# Patient Record
Sex: FEMALE | Race: WHITE | NOT HISPANIC OR LATINO | Employment: OTHER | URBAN - METROPOLITAN AREA
[De-identification: names, ages, dates, MRNs, and addresses within clinical notes are randomized per-mention and may not be internally consistent; named-entity substitution may affect disease eponyms.]

---

## 2024-09-26 ENCOUNTER — APPOINTMENT (OUTPATIENT)
Dept: CT IMAGING | Facility: CLINIC | Age: 84
End: 2024-09-26
Attending: SOCIAL WORKER
Payer: COMMERCIAL

## 2024-09-26 ENCOUNTER — APPOINTMENT (OUTPATIENT)
Dept: CARDIOLOGY | Facility: CLINIC | Age: 84
End: 2024-09-26
Attending: PHYSICIAN ASSISTANT
Payer: COMMERCIAL

## 2024-09-26 ENCOUNTER — APPOINTMENT (OUTPATIENT)
Dept: GENERAL RADIOLOGY | Facility: CLINIC | Age: 84
End: 2024-09-26
Attending: SOCIAL WORKER
Payer: COMMERCIAL

## 2024-09-26 ENCOUNTER — HOSPITAL ENCOUNTER (OUTPATIENT)
Facility: CLINIC | Age: 84
Setting detail: OBSERVATION
Discharge: HOME | End: 2024-09-27
Attending: SOCIAL WORKER | Admitting: HOSPITALIST
Payer: COMMERCIAL

## 2024-09-26 DIAGNOSIS — S32.2XXA CLOSED FRACTURE OF COCCYX, INITIAL ENCOUNTER (H): ICD-10-CM

## 2024-09-26 DIAGNOSIS — R00.0 TACHYARRHYTHMIA: ICD-10-CM

## 2024-09-26 DIAGNOSIS — E04.1 THYROID NODULE: ICD-10-CM
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
## 2024-09-26 DIAGNOSIS — R91.8 PULMONARY NODULES: ICD-10-CM

## 2024-09-26 PROBLEM — I48.91 ATRIAL FIBRILLATION WITH RVR (H): Status: ACTIVE | Noted: 2024-09-26
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
## 2024-09-26 LAB
ALBUMIN UR-MCNC: NEGATIVE MG/DL
ANION GAP SERPL CALCULATED.3IONS-SCNC: 17 MMOL/L (ref 7–15)
APPEARANCE UR: CLEAR
ATRIAL RATE - MUSE: 133 BPM
BACTERIA #/AREA URNS HPF: ABNORMAL /HPF
BASOPHILS # BLD AUTO: 0 10E3/UL (ref 0–0.2)
BASOPHILS NFR BLD AUTO: 1 %
BILIRUB UR QL STRIP: NEGATIVE
BUN SERPL-MCNC: 15.7 MG/DL (ref 8–23)
CALCIUM SERPL-MCNC: 9.1 MG/DL (ref 8.8–10.4)
CHLORIDE SERPL-SCNC: 104 MMOL/L (ref 98–107)
COLOR UR AUTO: ABNORMAL
CREAT SERPL-MCNC: 1.01 MG/DL (ref 0.51–0.95)
DIASTOLIC BLOOD PRESSURE - MUSE: NORMAL MMHG
EGFRCR SERPLBLD CKD-EPI 2021: 55 ML/MIN/1.73M2
EOSINOPHIL # BLD AUTO: 0.1 10E3/UL (ref 0–0.7)
EOSINOPHIL NFR BLD AUTO: 2 %
ERYTHROCYTE [DISTWIDTH] IN BLOOD BY AUTOMATED COUNT: 12.5 % (ref 10–15)
GLUCOSE BLDC GLUCOMTR-MCNC: 110 MG/DL (ref 70–99)
GLUCOSE SERPL-MCNC: 106 MG/DL (ref 70–99)
GLUCOSE UR STRIP-MCNC: NEGATIVE MG/DL
HCO3 BLDV-SCNC: 24 MMOL/L (ref 21–28)
HCO3 SERPL-SCNC: 18 MMOL/L (ref 22–29)
HCT VFR BLD AUTO: 45.1 % (ref 35–47)
HGB BLD-MCNC: 14.9 G/DL (ref 11.7–15.7)
HGB UR QL STRIP: NEGATIVE
HOLD SPECIMEN: NORMAL
IMM GRANULOCYTES # BLD: 0 10E3/UL
IMM GRANULOCYTES NFR BLD: 0 %
INTERPRETATION ECG - MUSE: NORMAL
KETONES UR STRIP-MCNC: 20 MG/DL
LACTATE BLD-SCNC: 3.1 MMOL/L
LACTATE SERPL-SCNC: 2.3 MMOL/L (ref 0.7–2)
LEUKOCYTE ESTERASE UR QL STRIP: ABNORMAL
LVEF ECHO: NORMAL
LYMPHOCYTES # BLD AUTO: 3.4 10E3/UL (ref 0.8–5.3)
LYMPHOCYTES NFR BLD AUTO: 42 %
MAGNESIUM SERPL-MCNC: 2.1 MG/DL (ref 1.7–2.3)
MCH RBC QN AUTO: 33.3 PG (ref 26.5–33)
MCHC RBC AUTO-ENTMCNC: 33 G/DL (ref 31.5–36.5)
MCV RBC AUTO: 101 FL (ref 78–100)
MONOCYTES # BLD AUTO: 0.7 10E3/UL (ref 0–1.3)
MONOCYTES NFR BLD AUTO: 8 %
MUCOUS THREADS #/AREA URNS LPF: PRESENT /LPF
NEUTROPHILS # BLD AUTO: 3.8 10E3/UL (ref 1.6–8.3)
NEUTROPHILS NFR BLD AUTO: 48 %
NITRATE UR QL: NEGATIVE
NRBC # BLD AUTO: 0 10E3/UL
NRBC BLD AUTO-RTO: 0 /100
NT-PROBNP SERPL-MCNC: 222 PG/ML (ref 0–1800)
P AXIS - MUSE: NORMAL DEGREES
PCO2 BLDV: 43 MM HG (ref 40–50)
PH BLDV: 7.35 [PH] (ref 7.32–7.43)
PH UR STRIP: 6 [PH] (ref 5–7)
PLATELET # BLD AUTO: 296 10E3/UL (ref 150–450)
PO2 BLDV: 16 MM HG (ref 25–47)
POTASSIUM SERPL-SCNC: 4.7 MMOL/L (ref 3.4–5.3)
PR INTERVAL - MUSE: 120 MS
QRS DURATION - MUSE: 86 MS
QT - MUSE: 306 MS
QTC - MUSE: 455 MS
R AXIS - MUSE: -61 DEGREES
RBC # BLD AUTO: 4.47 10E6/UL (ref 3.8–5.2)
RBC URINE: 1 /HPF
SAO2 % BLDV: 19 % (ref 70–75)
SODIUM SERPL-SCNC: 139 MMOL/L (ref 135–145)
SP GR UR STRIP: 1 (ref 1–1.03)
SQUAMOUS EPITHELIAL: 1 /HPF
SYSTOLIC BLOOD PRESSURE - MUSE: NORMAL MMHG
T AXIS - MUSE: -14 DEGREES
TROPONIN T SERPL HS-MCNC: 14 NG/L
TROPONIN T SERPL HS-MCNC: 25 NG/L
TROPONIN T SERPL HS-MCNC: 35 NG/L
TROPONIN T SERPL HS-MCNC: 37 NG/L
TSH SERPL DL<=0.005 MIU/L-ACNC: 0.43 UIU/ML (ref 0.3–4.2)
UROBILINOGEN UR STRIP-MCNC: NORMAL MG/DL
VENTRICULAR RATE- MUSE: 133 BPM
WBC # BLD AUTO: 8.1 10E3/UL (ref 4–11)
WBC URINE: 9 /HPF

## 2024-09-26 PROCEDURE — 85025 COMPLETE CBC W/AUTO DIFF WBC: CPT | Performed by: SOCIAL WORKER

## 2024-09-26 PROCEDURE — 96365 THER/PROPH/DIAG IV INF INIT: CPT

## 2024-09-26 PROCEDURE — 96361 HYDRATE IV INFUSION ADD-ON: CPT

## 2024-09-26 PROCEDURE — 93005 ELECTROCARDIOGRAM TRACING: CPT | Mod: 76

## 2024-09-26 PROCEDURE — 83605 ASSAY OF LACTIC ACID: CPT

## 2024-09-26 PROCEDURE — G0378 HOSPITAL OBSERVATION PER HR: HCPCS

## 2024-09-26 PROCEDURE — 82803 BLOOD GASES ANY COMBINATION: CPT

## 2024-09-26 PROCEDURE — 250N000009 HC RX 250: Performed by: SOCIAL WORKER

## 2024-09-26 PROCEDURE — 93010 ELECTROCARDIOGRAM REPORT: CPT | Performed by: INTERNAL MEDICINE

## 2024-09-26 PROCEDURE — 82962 GLUCOSE BLOOD TEST: CPT

## 2024-09-26 PROCEDURE — 250N000013 HC RX MED GY IP 250 OP 250 PS 637: Performed by: PHYSICIAN ASSISTANT

## 2024-09-26 PROCEDURE — G1010 CDSM STANSON: HCPCS

## 2024-09-26 PROCEDURE — 93005 ELECTROCARDIOGRAM TRACING: CPT

## 2024-09-26 PROCEDURE — 99291 CRITICAL CARE FIRST HOUR: CPT | Mod: 25

## 2024-09-26 PROCEDURE — 250N000011 HC RX IP 250 OP 636: Performed by: SOCIAL WORKER

## 2024-09-26 PROCEDURE — 84484 ASSAY OF TROPONIN QUANT: CPT | Performed by: SOCIAL WORKER

## 2024-09-26 PROCEDURE — 87086 URINE CULTURE/COLONY COUNT: CPT | Performed by: SOCIAL WORKER

## 2024-09-26 PROCEDURE — 96366 THER/PROPH/DIAG IV INF ADDON: CPT

## 2024-09-26 PROCEDURE — 99222 1ST HOSP IP/OBS MODERATE 55: CPT | Performed by: PHYSICIAN ASSISTANT

## 2024-09-26 PROCEDURE — 84484 ASSAY OF TROPONIN QUANT: CPT | Performed by: PHYSICIAN ASSISTANT

## 2024-09-26 PROCEDURE — 36415 COLL VENOUS BLD VENIPUNCTURE: CPT | Performed by: PHYSICIAN ASSISTANT

## 2024-09-26 PROCEDURE — 74177 CT ABD & PELVIS W/CONTRAST: CPT | Mod: MG

## 2024-09-26 PROCEDURE — 87040 BLOOD CULTURE FOR BACTERIA: CPT | Performed by: SOCIAL WORKER

## 2024-09-26 PROCEDURE — 83735 ASSAY OF MAGNESIUM: CPT | Performed by: PHYSICIAN ASSISTANT

## 2024-09-26 PROCEDURE — 93306 TTE W/DOPPLER COMPLETE: CPT | Mod: 26 | Performed by: INTERNAL MEDICINE

## 2024-09-26 PROCEDURE — 80048 BASIC METABOLIC PNL TOTAL CA: CPT | Performed by: SOCIAL WORKER

## 2024-09-26 PROCEDURE — 93306 TTE W/DOPPLER COMPLETE: CPT

## 2024-09-26 PROCEDURE — 36415 COLL VENOUS BLD VENIPUNCTURE: CPT | Performed by: SOCIAL WORKER

## 2024-09-26 PROCEDURE — 81001 URINALYSIS AUTO W/SCOPE: CPT | Performed by: SOCIAL WORKER

## 2024-09-26 PROCEDURE — 99222 1ST HOSP IP/OBS MODERATE 55: CPT | Mod: 25 | Performed by: INTERNAL MEDICINE

## 2024-09-26 PROCEDURE — 258N000003 HC RX IP 258 OP 636: Performed by: PHYSICIAN ASSISTANT

## 2024-09-26 PROCEDURE — 83880 ASSAY OF NATRIURETIC PEPTIDE: CPT | Performed by: SOCIAL WORKER

## 2024-09-26 PROCEDURE — 84443 ASSAY THYROID STIM HORMONE: CPT | Performed by: SOCIAL WORKER

## 2024-09-26 PROCEDURE — 71045 X-RAY EXAM CHEST 1 VIEW: CPT

## 2024-09-26 RX ORDER — SIMETHICONE 80 MG
80 TABLET,CHEWABLE ORAL EVERY 6 HOURS PRN
Status: DISCONTINUED | OUTPATIENT
Start: 2024-09-26 | End: 2024-09-27 | Stop reason: HOSPADM

## 2024-09-26 RX ORDER — ONDANSETRON 2 MG/ML
4 INJECTION INTRAMUSCULAR; INTRAVENOUS EVERY 6 HOURS PRN
Status: DISCONTINUED | OUTPATIENT
Start: 2024-09-26 | End: 2024-09-27 | Stop reason: HOSPADM

## 2024-09-26 RX ORDER — ONDANSETRON 4 MG/1
4 TABLET, ORALLY DISINTEGRATING ORAL EVERY 6 HOURS PRN
Status: DISCONTINUED | OUTPATIENT
Start: 2024-09-26 | End: 2024-09-27 | Stop reason: HOSPADM

## 2024-09-26 RX ORDER — AMOXICILLIN 250 MG
1 CAPSULE ORAL 2 TIMES DAILY PRN
Status: DISCONTINUED | OUTPATIENT
Start: 2024-09-26 | End: 2024-09-27 | Stop reason: HOSPADM

## 2024-09-26 RX ORDER — MONTELUKAST SODIUM 10 MG/1
10 TABLET ORAL AT BEDTIME
COMMUNITY

## 2024-09-26 RX ORDER — IOPAMIDOL 755 MG/ML
500 INJECTION, SOLUTION INTRAVASCULAR ONCE
Status: COMPLETED | OUTPATIENT
Start: 2024-09-26 | End: 2024-09-26

## 2024-09-26 RX ORDER — MONTELUKAST SODIUM 10 MG/1
10 TABLET ORAL AT BEDTIME
Status: DISCONTINUED | OUTPATIENT
Start: 2024-09-26 | End: 2024-09-27 | Stop reason: HOSPADM

## 2024-09-26 RX ORDER — ACETAMINOPHEN 325 MG/1
650 TABLET ORAL EVERY 4 HOURS PRN
Status: DISCONTINUED | OUTPATIENT
Start: 2024-09-26 | End: 2024-09-27 | Stop reason: HOSPADM

## 2024-09-26 RX ORDER — PREDNISOLONE ACETATE 10 MG/ML
1 SUSPENSION/ DROPS OPHTHALMIC 2 TIMES DAILY
COMMUNITY

## 2024-09-26 RX ORDER — HEPARIN SODIUM 10000 [USP'U]/100ML
0-5000 INJECTION, SOLUTION INTRAVENOUS CONTINUOUS
Status: DISCONTINUED | OUTPATIENT
Start: 2024-09-26 | End: 2024-09-26

## 2024-09-26 RX ORDER — AMOXICILLIN 250 MG
2 CAPSULE ORAL 2 TIMES DAILY PRN
Status: DISCONTINUED | OUTPATIENT
Start: 2024-09-26 | End: 2024-09-27 | Stop reason: HOSPADM

## 2024-09-26 RX ORDER — SODIUM CHLORIDE, SODIUM LACTATE, POTASSIUM CHLORIDE, CALCIUM CHLORIDE 600; 310; 30; 20 MG/100ML; MG/100ML; MG/100ML; MG/100ML
INJECTION, SOLUTION INTRAVENOUS CONTINUOUS
Status: DISCONTINUED | OUTPATIENT
Start: 2024-09-26 | End: 2024-09-27

## 2024-09-26 RX ORDER — NOREPINEPHRINE BITARTRATE 0.02 MG/ML
INJECTION, SOLUTION INTRAVENOUS
Status: COMPLETED
Start: 2024-09-26 | End: 2024-09-26

## 2024-09-26 RX ORDER — ACETAMINOPHEN 650 MG/1
650 SUPPOSITORY RECTAL EVERY 4 HOURS PRN
Status: DISCONTINUED | OUTPATIENT
Start: 2024-09-26 | End: 2024-09-27 | Stop reason: HOSPADM

## 2024-09-26 RX ADMIN — HEPARIN SODIUM 800 UNITS/HR: 10000 INJECTION, SOLUTION INTRAVENOUS at 07:44

## 2024-09-26 RX ADMIN — SODIUM CHLORIDE 75 ML: 9 INJECTION, SOLUTION INTRAVENOUS at 07:33

## 2024-09-26 RX ADMIN — IOPAMIDOL 73 ML: 755 INJECTION, SOLUTION INTRAVENOUS at 07:33

## 2024-09-26 RX ADMIN — MONTELUKAST 10 MG: 10 TABLET, FILM COATED ORAL at 21:07

## 2024-09-26 RX ADMIN — SODIUM CHLORIDE, POTASSIUM CHLORIDE, SODIUM LACTATE AND CALCIUM CHLORIDE: 600; 310; 30; 20 INJECTION, SOLUTION INTRAVENOUS at 12:10

## 2024-09-26 RX ADMIN — SIMETHICONE 80 MG: 80 TABLET, CHEWABLE ORAL at 20:09

## 2024-09-26 ASSESSMENT — ACTIVITIES OF DAILY LIVING (ADL)
ADLS_ACUITY_SCORE: 29
ADLS_ACUITY_SCORE: 39
ADLS_ACUITY_SCORE: 29
ADLS_ACUITY_SCORE: 29
ADLS_ACUITY_SCORE: 35
ADLS_ACUITY_SCORE: 29
ADLS_ACUITY_SCORE: 29
ADLS_ACUITY_SCORE: 39
ADLS_ACUITY_SCORE: 29
ADLS_ACUITY_SCORE: 29
ADLS_ACUITY_SCORE: 37
ADLS_ACUITY_SCORE: 35
ADLS_ACUITY_SCORE: 35
ADLS_ACUITY_SCORE: 29
ADLS_ACUITY_SCORE: 27
ADLS_ACUITY_SCORE: 29
ADLS_ACUITY_SCORE: 29
ADLS_ACUITY_SCORE: 35
ADLS_ACUITY_SCORE: 29

## 2024-09-26 NOTE — ED PROVIDER NOTES
Emergency Department Note      History of Present Illness     Chief Complaint   No chief complaint on file.      HPI   Sonia Lopez is a 84 year old female who presents to the ED via EMS from home for evaluation of a head injury due to a fall. Per EMS report, the patient became suddenly shortness of breath and dizzy upon standing when walking to use the bathroom tonight which caused her to fall, striking her head on the ground. Doesn't think she lost consciousness. EMS found her to be hypoxic, tachycardic, and hypotensive with ecchymosis to her left temple. 4 LPM supplemental oxygen was administered although reportedly not overtly hypotensive. Patient states she continues to feel short of breath despite the supplemental oxygen, denies any chest pain. Feels like she needs to urinate. She felt normal when she went to bed. She currently lives at home with her daughter. Denies history of heart or lung disease. Patient is not on blood thinners.    Independent Historian   EMS as detailed above.    Review of External Notes   Patient currently has no external notes available for review     Past Medical History     Medical History and Problem List   Cataracts  Osteopenia  Osteoporosis     Medications   Singulair  Tramadol     Surgical History   Appendectomy  Tubal ligation  Hysterectomy  Bunionectomy (B)  Cataract extraction (B)  Blepharoplasty (B)    Physical Exam     Patient Vitals for the past 24 hrs:   BP Temp Temp src Pulse Resp SpO2 Weight   09/26/24 2035 126/43 98.3  F (36.8  C) Oral 57 18 95 % --   09/26/24 2008 127/46 98.2  F (36.8  C) Oral 56 16 95 % --   09/26/24 1545 (!) 127/38 98.2  F (36.8  C) Oral 55 16 96 % --   09/26/24 1332 117/43 -- -- 55 -- -- --   09/26/24 1054 -- 98.3  F (36.8  C) Oral 62 17 97 % --   09/26/24 1000 97/43 -- -- 51 15 95 % --   09/26/24 0950 103/45 -- -- 54 15 94 % --   09/26/24 0940 102/48 -- -- 55 24 94 % --   09/26/24 0920 103/62 -- -- 63 17 100 % --   09/26/24 0910 98/59 -- --  65 18 94 % --   09/26/24 0900 103/46 -- -- 63 15 96 % --   09/26/24 0850 92/58 -- -- 58 15 97 % --   09/26/24 0844 110/56 -- -- 66 14 98 % --   09/26/24 0835 -- -- -- 68 16 100 % 68 kg (149 lb 14.6 oz)   09/26/24 0830 100/49 -- -- 67 11 98 % --   09/26/24 0820 (!) 89/47 -- -- 54 16 95 % --   09/26/24 0745 108/59 -- -- 70 15 98 % --   09/26/24 0740 100/52 -- -- 64 16 97 % --   09/26/24 0700 98/47 -- -- 61 17 95 % --   09/26/24 0643 96/63 -- -- (!) 139 14 97 % --   09/26/24 0633 (!) 80/56 -- -- (!) 146 16 92 % --   09/26/24 0603 115/62 -- -- (!) 130 12 97 % --   09/26/24 0558 111/74 -- -- (!) 122 29 99 % --   09/26/24 0553 93/71 -- -- (!) 133 23 97 % --   09/26/24 0550 98/67 -- -- (!) 145 27 97 % --   09/26/24 0545 (!) 89/53 -- -- (!) 145 10 98 % --   09/26/24 0540 (!) 71/49 -- -- (!) 144 21 97 % --   09/26/24 0538 (!) 74/43 -- -- (!) 149 27 98 % --   09/26/24 0537 (!) 77/61 -- -- (!) 128 19 97 % --   09/26/24 0532 (!) 81/58 -- -- (!) 145 (!) 33 97 % --   09/26/24 0531 -- -- -- (!) 132 19 98 % --   09/26/24 0521 97/61 -- -- (!) 140 20 -- --   09/26/24 0516 (!) 136/116 -- -- (!) 125 (!) 40 99 % --   09/26/24 0511 (!) 131/122 -- -- (!) 142 (!) 48 99 % --   09/26/24 0509 -- 97.6  F (36.4  C) Oral -- -- -- --   09/26/24 0508 -- -- -- -- (!) 33 100 % --   09/26/24 0507 -- -- -- (!) 151 (!) 39 100 % --   09/26/24 0506 -- -- -- -- 13 100 % --   09/26/24 0505 -- -- -- -- -- 100 % --   09/26/24 0504 -- -- -- -- -- 100 % --   09/26/24 0502 121/73 -- -- 69 -- -- --     Physical Exam  General: In moderate distress, tachypneic   HEENT: Mucous membranes moist; hematoma over the left eyebrow area   Neuro: Alert, moving all extremities equally with intention  CV: Tachycardic, irregular rate    Pulses equal throughout   Respiratory:  Tachypneic   Lungs clear to auscultation   Abdomen: Soft, without rigidity or rebound throughout   Tender to palpation in the suprapubic region   MSK: No unilateral LE swelling, no edema     Diagnostics      Lab Results   Labs Ordered and Resulted from Time of ED Arrival to Time of ED Departure   BASIC METABOLIC PANEL - Abnormal       Result Value    Sodium 139      Potassium 4.7      Chloride 104      Carbon Dioxide (CO2) 18 (*)     Anion Gap 17 (*)     Urea Nitrogen 15.7      Creatinine 1.01 (*)     GFR Estimate 55 (*)     Calcium 9.1      Glucose 106 (*)    CBC WITH PLATELETS AND DIFFERENTIAL - Abnormal    WBC Count 8.1      RBC Count 4.47      Hemoglobin 14.9      Hematocrit 45.1       (*)     MCH 33.3 (*)     MCHC 33.0      RDW 12.5      Platelet Count 296      % Neutrophils 48      % Lymphocytes 42      % Monocytes 8      % Eosinophils 2      % Basophils 1      % Immature Granulocytes 0      NRBCs per 100 WBC 0      Absolute Neutrophils 3.8      Absolute Lymphocytes 3.4      Absolute Monocytes 0.7      Absolute Eosinophils 0.1      Absolute Basophils 0.0      Absolute Immature Granulocytes 0.0      Absolute NRBCs 0.0     GLUCOSE BY METER - Abnormal    GLUCOSE BY METER POCT 110 (*)    ISTAT GASES LACTATE VENOUS POCT - Abnormal    Lactic Acid POCT 3.1 (*)     Bicarbonate Venous POCT 24      O2 Sat, Venous POCT 19 (*)     pCO2 Venous POCT 43      pH Venous POCT 7.35      pO2 Venous POCT 16 (*)    ROUTINE UA WITH MICROSCOPIC REFLEX TO CULTURE - Abnormal    Color Urine Light Yellow      Appearance Urine Clear      Glucose Urine Negative      Bilirubin Urine Negative      Ketones Urine 20 (*)     Specific Gravity Urine 1.005      Blood Urine Negative      pH Urine 6.0      Protein Albumin Urine Negative      Urobilinogen Urine Normal      Nitrite Urine Negative      Leukocyte Esterase Urine Large (*)     Bacteria Urine Few (*)     Mucus Urine Present (*)     RBC Urine 1      WBC Urine 9 (*)     Squamous Epithelials Urine 1     TROPONIN T, HIGH SENSITIVITY - Abnormal    Troponin T, High Sensitivity 25 (*)    LACTIC ACID WHOLE BLOOD - Abnormal    Lactic Acid 2.3 (*)    TROPONIN T, HIGH SENSITIVITY - Normal     Troponin T, High Sensitivity 14     NT PROBNP INPATIENT - Normal    N terminal Pro BNP Inpatient 222     TSH WITH FREE T4 REFLEX - Normal    TSH 0.43     MAGNESIUM - Normal    Magnesium 2.1     URINE CULTURE       Imaging   Echocardiogram Complete   Final Result      CT Chest (PE) Abdomen Pelvis w Contrast   Final Result   IMPRESSION:   1.  No evidence of acute pulmonary embolism.   2.  Age-indeterminate minimally displaced fracture of the coccyx which   can be correlated with point tenderness. Otherwise, no acute traumatic   findings within the chest, abdomen, or pelvis.   3.  Marked distention of the urinary bladder.   4.  Right thyroid nodule measuring 2.8 cm. Recommend outpatient   thyroid ultrasound.   5.  Pulmonary nodules measuring up to 5 mm in size. See   recommendations below.      REFERENCE:   Guidelines for Management of Incidental Pulmonary Nodules Detected on   CT Images: From the Fleischner Society 2017.    Guidelines apply to incidental nodules in patients who are 35 years or   older.   Guidelines do not apply to lung cancer screening, patients with   immunosuppression, or patients with known primary cancer.      MULTIPLE NODULES   Nodule size <6 mm   Low-risk patients: No follow-up needed.   High-risk patients: Optional follow-up at 12 months.      MICHELLE FERGUSON MD            SYSTEM ID:  [id number]      Head CT w/o contrast   Final Result   IMPRESSION:    1. No acute intracranial pathology.    2. Chronic small vessel ischemic disease.      MIRELA VILLANUEVA MD            SYSTEM ID:  [id number]      XR Chest Port 1 View   Final Result   IMPRESSION: Hyperinflation. Areas of increased nodular density in the left lung base projecting over the heart may represent retrocardiac infiltrate or pneumonia. Normal heart size and pulmonary vascularity. Aortic calcification.          EKG  ECG taken at 0507, ECG read at 0508  Undetermined rhythm   Left axis deviation  Incomplete right bundle branch block  ST  depression, consider subendocardial injury   Rate 133 bpm. UT interval * ms. QRS duration 102 ms. QT/QTc 282/419 ms. P-R-T axes * -60 18.     EKG   ECG taken at 0606, ECG read at 0610  Undetermined rhythm  Left axis deviation  Anterior infarct, age undetermined  Marked ST abnormality, possible inferior subendocardial injury   No significant change as compared to prior, dated 9/26/24 at 0507.  Rate 133 bpm. UT interval 120 ms. QRS duration 86 ms. QT/QTc 306/455 ms. P-R-T axes * -61 -14.    EKG  ECG taken at 0650, ECG read at 0651  Sinus rhythm with sinus arrhythmia   Incomplete right bundle branch block  Left anterior fascicular block  Rhythm is now sinus as compared to prior  Rate 62 bpm. UT interval 62 ms. QRS duration 166 ms. QT/QTc 400/406 ms. P-R-T axes 64 -46 38.     Independent Interpretation   CXR: No significant pulmonary edema     ED Course      Medications Administered   Medications - No data to display    Procedures   Procedures     Bedside POCUS  Bilateral lung sliding  No B-lines seen   No pericardial effusion   No abdominal aortic aneurysm  No free fluid in abdomen  Large distended bladder     Discussion of Management   Admitting Hospitalist, Jessica Camara PA-C     ED Course   ED Course as of 09/26/24 2113   Thu Sep 26, 2024   0510 I obtained history and performed a physical exam as noted above.    0552 Lungs clear, no pericardial effusion, no abdominal aortic dilation.    0604 Reassessed, patient states her breathing is now very good and BP is 110s systolic    0610 Repeat EKG appears to be more consistent with flutter    0653 I rechecked and updated the patient.    0658 Patient is now in sinus rhythm.        Additional Documentation  None    Medical Decision Making / Diagnosis     CMS Diagnoses: None    MIPS       None    MDM   Sonia Lopez is a 84 year old female with reportedly no heart or lung problems, not on AC, who presents to the ED with chief complaint of fall in setting of sudden onset  shortness of breath and dizziness.  On arrival to the emergency department, patient was in moderate distress on O2 although never noted to be hypoxic per EMS. HR in predominantly in the 130s-140 with irregular rhythm, afib vs flutter. Pt initially not hypotensive however did become hypotensive which responded to fluid administration. No pulmonary edema, chest pain, or altered mental status to warrant immediate cardioversion.  Bedside ultrasound as above. Patient spontaneously converted to sinus rhythm and was started on heparin given CHADsVASC 3. CT head without evidence of bleed and CT PE showed no signs of PE, did show age-indeterminate coccyx fracture, thyroid nodule, and pulmonary nodules. No further dizziness noted and moving all extremities equally. UA possibly consistent with UTI, will begin ceftriaxone. Likely will need cardiology consult, echo, and further monitoring. Discussed with hospitalist Jessica Camara PA-C, who kindly accepted for admission.     Disposition   The patient was admitted to the hospital.     Diagnosis     ICD-10-CM    1. Tachyarrhythmia  R00.0       2. Ground-level fall  W18.30XA       3. Thyroid nodule  E04.1       4. Pulmonary nodules  R91.8       5. Closed fracture of coccyx, initial encounter (H)  S32.2XXA            Discharge Medications   New Prescriptions    No medications on file         Scribe Disclosure:  Pamela LOMELI, am serving as a scribe at 5:04 AM on 9/26/2024 to document services personally performed by Maranda Marshall MD based on my observations and the provider's statements to me.        Maranda Marshall MD  09/26/24 7102

## 2024-09-26 NOTE — ED NOTES
[facility]  ED Nurse Handoff Report    ED Chief complaint: Fall  . ED Diagnosis:   Final diagnoses:   None       Allergies:   Allergies   Allergen Reactions    Amoxicillin        Code Status: Full Code    Activity level - Baseline/Home:  independent.  Activity Level - Current:   assist of 1.   Lift room needed: No.   Bariatric: No   Needed: No   Isolation: No.   Infection: Not Applicable.     Respiratory status: Room air    Vital Signs (within 30 minutes):   Vitals:    09/26/24 0850 09/26/24 0900 09/26/24 0910 09/26/24 0920   BP: 92/58 103/46 98/59 103/62   Pulse: 58 63 65 63   Resp: 15 15 18 17   Temp:       TempSrc:       SpO2: 97% 96% 94% 100%   Weight:           Cardiac Rhythm:  ,      Pain level:    Patient confused: No.   Patient Falls Risk: nonskid shoes/slippers when out of bed, arm band in place, patient and family education, assistive device/personal items within reach, activity supervised, and room door open.   Elimination Status: Has voided     Patient Report - Initial Complaint: Fell at home and hit her head.   Focused Assessment: Sonia Lopez is a 84 year old female who presents to the ED via EMS from home for evaluation of a head injury due to a fall. Per EMS report, the patient became suddenly shortness of breath and dizzy upon standing when waking to use the bathroom tonight which caused her to fall, striking her head on the ground. EMS found her to be hypoxic, tachycardic, and hypotensive with ecchymosis to her left temple. 4 LPM supplemental oxygen was administered. Patient states she continues to feel short of breath despite the supplemental oxygen. Reports she woke up with pressure in her pelvis and nausea. She felt normal when she went to bed. She currently lives at home with her daughter. Denies history of heart or lung disease. Patient is not on blood thinners.      Abnormal Results:   Labs Ordered and Resulted from Time of ED Arrival to Time of ED  Departure   BASIC METABOLIC PANEL - Abnormal       Result Value    Sodium 139      Potassium 4.7      Chloride 104      Carbon Dioxide (CO2) 18 (*)     Anion Gap 17 (*)     Urea Nitrogen 15.7      Creatinine 1.01 (*)     GFR Estimate 55 (*)     Calcium 9.1      Glucose 106 (*)    CBC WITH PLATELETS AND DIFFERENTIAL - Abnormal    WBC Count 8.1      RBC Count 4.47      Hemoglobin 14.9      Hematocrit 45.1       (*)     MCH 33.3 (*)     MCHC 33.0      RDW 12.5      Platelet Count 296      % Neutrophils 48      % Lymphocytes 42      % Monocytes 8      % Eosinophils 2      % Basophils 1      % Immature Granulocytes 0      NRBCs per 100 WBC 0      Absolute Neutrophils 3.8      Absolute Lymphocytes 3.4      Absolute Monocytes 0.7      Absolute Eosinophils 0.1      Absolute Basophils 0.0      Absolute Immature Granulocytes 0.0      Absolute NRBCs 0.0     GLUCOSE BY METER - Abnormal    GLUCOSE BY METER POCT 110 (*)    ISTAT GASES LACTATE VENOUS POCT - Abnormal    Lactic Acid POCT 3.1 (*)     Bicarbonate Venous POCT 24      O2 Sat, Venous POCT 19 (*)     pCO2 Venous POCT 43      pH Venous POCT 7.35      pO2 Venous POCT 16 (*)    ROUTINE UA WITH MICROSCOPIC REFLEX TO CULTURE - Abnormal    Color Urine Light Yellow      Appearance Urine Clear      Glucose Urine Negative      Bilirubin Urine Negative      Ketones Urine 20 (*)     Specific Gravity Urine 1.005      Blood Urine Negative      pH Urine 6.0      Protein Albumin Urine Negative      Urobilinogen Urine Normal      Nitrite Urine Negative      Leukocyte Esterase Urine Large (*)     Bacteria Urine Few (*)     Mucus Urine Present (*)     RBC Urine 1      WBC Urine 9 (*)     Squamous Epithelials Urine 1     TROPONIN T, HIGH SENSITIVITY - Abnormal    Troponin T, High Sensitivity 25 (*)    LACTIC ACID WHOLE BLOOD - Abnormal    Lactic Acid 2.3 (*)    TROPONIN T, HIGH SENSITIVITY - Normal    Troponin T, High Sensitivity 14     NT PROBNP INPATIENT - Normal    N terminal Pro  BNP Inpatient 222     TSH WITH FREE T4 REFLEX   MAGNESIUM   BLOOD CULTURE   URINE CULTURE        CT Chest (PE) Abdomen Pelvis w Contrast   Final Result   IMPRESSION:   1.  No evidence of acute pulmonary embolism.   2.  Age-indeterminate minimally displaced fracture of the coccyx which   can be correlated with point tenderness. Otherwise, no acute traumatic   findings within the chest, abdomen, or pelvis.   3.  Marked distention of the urinary bladder.   4.  Right thyroid nodule measuring 2.8 cm. Recommend outpatient   thyroid ultrasound.   5.  Pulmonary nodules measuring up to 5 mm in size. See   recommendations below.      REFERENCE:   Guidelines for Management of Incidental Pulmonary Nodules Detected on   CT Images: From the Fleischner Society 2017.    Guidelines apply to incidental nodules in patients who are 35 years or   older.   Guidelines do not apply to lung cancer screening, patients with   immunosuppression, or patients with known primary cancer.      MULTIPLE NODULES   Nodule size <6 mm   Low-risk patients: No follow-up needed.   High-risk patients: Optional follow-up at 12 months.      MICHELLE FERGUSON MD            SYSTEM ID:  [id number]      Head CT w/o contrast   Final Result   IMPRESSION:    1. No acute intracranial pathology.    2. Chronic small vessel ischemic disease.      MIRELA VILLANUEVA MD            SYSTEM ID:  [id number]      XR Chest Port 1 View   Final Result   IMPRESSION: Hyperinflation. Areas of increased nodular density in the left lung base projecting over the heart may represent retrocardiac infiltrate or pneumonia. Normal heart size and pulmonary vascularity. Aortic calcification.          Treatments provided: Imaging done and heparin started.   Family Comments: Pt stated daughter will be here around 1030.  OBS brochure/video discussed/provided to patient:  Yes  ED Medications:   Medications   heparin 25,000 units in 0.45% NaCl 250 mL ANTICOAGULANT infusion (800 Units/hr Intravenous  $New Bag 9/26/24 0744)   norepinephrine (LEVOPHED) 0.016 mg/mL 4 mg in  mL infusion PREMIX (  Not Given 9/26/24 0708)   heparin loading dose for LOW INTENSITY TREATMENT * Give BEFORE starting heparin infusion (4,100 Units Intravenous $Given 9/26/24 0742)   CT SCAN FLUSH (75 mLs Intravenous $Given 9/26/24 0733)   iopamidol (ISOVUE-370) solution 500 mL (73 mLs Intravenous $Given 9/26/24 0733)       Drips infusing:  Heparin infusing.   For the majority of the shift this patient was Green.   Interventions performed were Imaging done and heparin started..    Sepsis treatment initiated: No    Cares/treatment/interventions/medications to be completed following ED care: Imaging done and heparin started.    ED Nurse Name: Zeny Tarango RN  9:40 AM     RECEIVING UNIT ED HANDOFF REVIEW    Above ED Nurse Handoff Report was reviewed: Yes  Reviewed by: Mely Skaggs RN on September 26, 2024 at 10:13 AM

## 2024-09-26 NOTE — PLAN OF CARE
PRIMARY DIAGNOSIS:syncope   OUTPATIENT/OBSERVATION GOALS TO BE MET BEFORE DISCHARGE:  1. Pain Status: Improved-controlled with oral pain medications.    2. Return to near baseline physical activity: No    3. Cleared for discharge by consultants (if involved): No    Discharge Planner Nurse   Safe discharge environment identified: No  Barriers to discharge: No       Entered by: Joanie Joyner RN 09/26/2024 5:17 PM   Pt alert and oriented, VSS, Pt has IV fluids at 100 ml,, pt  tolerated regulate  diet, orthostatic pressure negative. Echo completed, tele SR HR 60 pt denies pain and dizziness when standing up  pt up  with one assisted for safety the daughter at the bed sidecardiology seen the patient, potential early discharge tomorrow,         Please review provider order for any additional goals.   Nurse to notify provider when observation goals have been met and patient is ready for discharge.Goal Outcome Evaluation:      Plan of Care Reviewed With: patient    Overall Patient Progress: improvingOverall Patient Progress: improving

## 2024-09-26 NOTE — CONSULTS
Cardiology Consultation      Sonia Humphrey MRN# [id number]   YOB: 1940 Age: 84 year old   Date of Admission: 9/26/2024     Reason for consult:            Assessment and Plan:     Presyncope possibly associated with reentry tachycardia unknown if atrial tachycardia versus atypical flutter  Would like her to have some sort of monitoring on discharge.  Discussed 1 week monitor but patient is going out of the country soon.  Apple watch might be reasonable.  This has not happened previously.  She does not have hypertension and no known history of cardiomyopathy so her stroke risk factor is mainly based off of age and sex.  Patient would not be able to get a mail out ZIO before she leaves the country.  If no significant cardiomyopathy on echocardiogram, okay to discharge on small dose of metoprolol 12.5 mg twice daily knowing that this could exacerbate symptoms if she has underlying sick sinus syndrome.  Should follow-up with cardiology in [place] 1 month.    Moderate complexity    The longitudinal plan of care for the diagnosis(es)/condition(s) as documented were addressed during this visit. Due to the added complexity in care, I will continue to support Sonia in the subsequent management and with ongoing continuity of care.          Chief Complaint:   Fall           History of Present Illness:   This patient is a 84 year old female with no previous cardiovascular history who comes in after presyncope, tachycardia, now in sinus rhythm.    Prior to this presentation, she denies any exertional chest discomfort or dyspnea on exertion.  Good exercise tolerance.  No lightheadedness.  No presyncope.    Overnight, when she got up to go to the bathroom she felt lightheaded and dizzy.  I reviewed the ECG from her presentation and it is some sort of reentry tachycardia, possible atypical atrial flutter versus atrial tachycardia with 2-1 block with heart rate greater than 40 bpm.  No significant troponin  elevation.    She spontaneously converted to sinus rhythm.    She lives in [place] and is looking to go back there in the next week.  Echocardiogram is pending.         Physical Exam:   Vitals were reviewed  Blood pressure 97/43, pulse 62, temperature 98.3  F (36.8  C), temperature source Oral, resp. rate 17, weight 68 kg (149 lb 14.6 oz), SpO2 97%.  Temperatures:  Current - Temp: 98.3  F (36.8  C); Max - Temp  Av  F (36.7  C)  Min: 97.6  F (36.4  C)  Max: 98.3  F (36.8  C)  Respiration range: Resp  Av.7  Min: 10  Max: 48  Pulse range: Pulse  Av.2  Min: 51  Max: 151  Blood pressure range: Systolic (24hrs), Av , Min:71 , Max:136   ; Diastolic (24hrs), Av, Min:43, Max:122    Pulse oximetry range: SpO2  Av.4 %  Min: 92 %  Max: 100 %    Intake/Output Summary (Last 24 hours) at 2024 1153  Last data filed at 2024 0514  Gross per 24 hour   Intake 200 ml   Output --   Net 200 ml     Constitutional:   awake, alert, cooperative, no apparent distress, and appears stated age     Eyes:   Lids and lashes normal, pupils equal, round and reactive to light, extra ocular muscles intact, sclera clear, conjunctiva normal     Neck:   supple, symmetrical, trachea midline,      Back:   symmetric     Lungs:   Symmetric     Cardiovascular:   Regular, no significant murmurs.  Distant heart sounds.     Abdomen:   non-tender     Musculoskeletal:   motor strength is 5 out of 5 all extremities bilaterally     Neurologic:   Grossly nonfocal     Skin:   normal skin color, texture, turgor     Additional findings:                    Past Medical History:   I have reviewed this patient's past medical history  No past medical history on file.          Past Surgical History:   I have reviewed this patient's past surgical history  No past surgical history on file.            Social History:   I have reviewed this patient's social history  Social History     Tobacco Use    Smoking status: Not on file    Smokeless  tobacco: Not on file   Substance Use Topics    Alcohol use: Not on file             Family History:   I have reviewed this patient's family history  No family history on file.          Allergies:     Allergies   Allergen Reactions    Amoxicillin              Medications:   I have reviewed this patient's current medications  Medications Prior to Admission   Medication Sig Dispense Refill Last Dose    CALCIUM PO Take 1 tablet by mouth daily.   9/25/2024 at -    MAGNESIUM PO Take 1 tablet by mouth daily.   9/25/2024 at -    montelukast (SINGULAIR) 10 MG tablet Take 10 mg by mouth at bedtime.   9/25/2024 at pm    prednisoLONE acetate (PRED FORTE) 1 % ophthalmic suspension Place 1 drop Into the left eye 2 times daily. After completing two times daily, decrease to daily.   9/25/2024 at pm     Current Facility-Administered Medications   Medication Dose Route Frequency Provider Last Rate Last Admin    acetaminophen (TYLENOL) tablet 650 mg  650 mg Oral Q4H PRN Jamila Camara PA-C        Or    acetaminophen (TYLENOL) Suppository 650 mg  650 mg Rectal Q4H PRN Jamila Camraa PA-C        lactated ringers infusion   Intravenous Continuous Jamila Camara PA-C        montelukast (SINGULAIR) tablet 10 mg  10 mg Oral At Bedtime Jamila Camara PA-C        ondansetron (ZOFRAN ODT) ODT tab 4 mg  4 mg Oral Q6H PRN Jamila Camara PA-C        Or    ondansetron (ZOFRAN) injection 4 mg  4 mg Intravenous Q6H PRN Jamila Camara PA-C        senna-docusate (SENOKOT-S/PERICOLACE) 8.6-50 MG per tablet 1 tablet  1 tablet Oral BID PRN Jamila Camara PA-C        Or    senna-docusate (SENOKOT-S/PERICOLACE) 8.6-50 MG per tablet 2 tablet  2 tablet Oral BID PRN Jamila Camara PA-C                 Review of Systems:     The 10 point Review of Systems is negative other than noted in the HPI            Data:   All laboratory data reviewed  Results for orders placed or performed  during the hospital encounter of 09/26/24 (from the past 24 hour(s))   Glucose by meter   Result Value Ref Range    GLUCOSE BY METER POCT 110 (H) 70 - 99 mg/dL   iStat Gases (lactate) venous, POCT   Result Value Ref Range    Lactic Acid POCT 3.1 (H) <=2.0 mmol/L    Bicarbonate Venous POCT 24 21 - 28 mmol/L    O2 Sat, Venous POCT 19 (L) 70 - 75 %    pCO2 Venous POCT 43 40 - 50 mm Hg    pH Venous POCT 7.35 7.32 - 7.43    pO2 Venous POCT 16 (L) 25 - 47 mm Hg   Troponin T, High Sensitivity   Result Value Ref Range    Troponin T, High Sensitivity 14 <=14 ng/L   CBC + differential    Narrative    The following orders were created for panel order CBC + differential.  Procedure                               Abnormality         Status                     ---------                               -----------         ------                     CBC with platelets and d...[[id number]]  Abnormal            Final result                 Please view results for these tests on the individual orders.   Basic metabolic panel (BMP)   Result Value Ref Range    Sodium 139 135 - 145 mmol/L    Potassium 4.7 3.4 - 5.3 mmol/L    Chloride 104 98 - 107 mmol/L    Carbon Dioxide (CO2) 18 (L) 22 - 29 mmol/L    Anion Gap 17 (H) 7 - 15 mmol/L    Urea Nitrogen 15.7 8.0 - 23.0 mg/dL    Creatinine 1.01 (H) 0.51 - 0.95 mg/dL    GFR Estimate 55 (L) >60 mL/min/1.73m2    Calcium 9.1 8.8 - 10.4 mg/dL    Glucose 106 (H) 70 - 99 mg/dL   BNP   Result Value Ref Range    N terminal Pro BNP Inpatient 222 0 - 1,800 pg/mL   CBC with platelets and differential   Result Value Ref Range    WBC Count 8.1 4.0 - 11.0 10e3/uL    RBC Count 4.47 3.80 - 5.20 10e6/uL    Hemoglobin 14.9 11.7 - 15.7 g/dL    Hematocrit 45.1 35.0 - 47.0 %     (H) 78 - 100 fL    MCH 33.3 (H) 26.5 - 33.0 pg    MCHC 33.0 31.5 - 36.5 g/dL    RDW 12.5 10.0 - 15.0 %    Platelet Count 296 150 - 450 10e3/uL    % Neutrophils 48 %    % Lymphocytes 42 %    % Monocytes 8 %    % Eosinophils 2 %    %  Basophils 1 %    % Immature Granulocytes 0 %    NRBCs per 100 WBC 0 <1 /100    Absolute Neutrophils 3.8 1.6 - 8.3 10e3/uL    Absolute Lymphocytes 3.4 0.8 - 5.3 10e3/uL    Absolute Monocytes 0.7 0.0 - 1.3 10e3/uL    Absolute Eosinophils 0.1 0.0 - 0.7 10e3/uL    Absolute Basophils 0.0 0.0 - 0.2 10e3/uL    Absolute Immature Granulocytes 0.0 <=0.4 10e3/uL    Absolute NRBCs 0.0 10e3/uL   [place] Draw    Narrative    The following orders were created for panel order [place] Draw.  Procedure                               Abnormality         Status                     ---------                               -----------         ------                     Extra Blue Top Tube[[id number]]                              Final result                 Please view results for these tests on the individual orders.   Extra Blue Top Tube   Result Value Ref Range    Hold Specimen JIC    XR Chest Port 1 View    Narrative    EXAM: XR CHEST PORT 1 VIEW  LOCATION: [facility]  DATE: 9/26/2024    INDICATION: Shortness of breath  COMPARISON: None.      Impression    IMPRESSION: Hyperinflation. Areas of increased nodular density in the left lung base projecting over the heart may represent retrocardiac infiltrate or pneumonia. Normal heart size and pulmonary vascularity. Aortic calcification.   EKG 12 lead   Result Value Ref Range    Systolic Blood Pressure  mmHg    Diastolic Blood Pressure  mmHg    Ventricular Rate 133 BPM    Atrial Rate 133 BPM    RI Interval 120 ms    QRS Duration 86 ms     ms    QTc 455 ms    P Axis  degrees    R AXIS -61 degrees    T Axis -14 degrees    Interpretation ECG       Undetermined rhythm  Left axis deviation  Anterior infarct , age undetermined  Marked ST abnormality, possible inferior subendocardial injury  Abnormal ECG  When compared with ECG of 26-Sep-2024 05:07, (unconfirmed)  Current undetermined rhythm precludes rhythm comparison, needs review  Anterior infarct is now Present  ST  more depressed Inferior leads  Unconfirmed report - interpretation of this ECG is computer generated - see medical record for final interpretation  Confirmed by - EMERGENCY ROOM, PHYSICIAN ([id number]),  FARA TSAI ([id number]) on 9/26/2024 6:36:17 AM     Head CT w/o contrast    Narrative    EXAM: CT HEAD W/O CONTRAST  9/26/2024 7:43 AM     HISTORY:  Fall, struck head       COMPARISON:  No prior similar studies    TECHNIQUE: Using multidetector thin collimation helical acquisition  technique, axial, coronal and sagittal CT images from the skull base  to the vertex were obtained without intravenous contrast.   (topogram) image(s) also obtained and reviewed. Dose reduction  techniques were performed.    FINDINGS:  No intracranial hemorrhage, mass effect, or midline shift. No acute  loss of gray-white matter differentiation in the cerebral hemispheres.  Ventricles are proportionate to the cerebral sulci. Clear basal  cisterns. Periventricular and subcortical hypodensities, representing  chronic small vessel ischemic disease.    The bony calvaria and the bones of the skull base are normal.  Hyperdense lesion in the left maxillary sinus, may represent an  unerupted tooth or osteoma. Mastoid air cells are clear. Grossly  normal orbits.       Impression    IMPRESSION:   1. No acute intracranial pathology.   2. Chronic small vessel ischemic disease.    MIRELA VILLANUEVA MD         SYSTEM ID:  [id number]   CT Chest (PE) Abdomen Pelvis w Contrast    Narrative    CT CHEST PE ABDOMEN PELVIS W CONTRAST 9/26/2024 7:56 AM    CLINICAL HISTORY: Patient developed sudden shortness of breath. Fall.  Difficulty emptying bladder.    TECHNIQUE: TECHNIQUE: CT angiogram chest and routine CT abdomen pelvis  with IV contrast. Arterial phase through the chest and venous phase  through the abdomen and pelvis. 2D and 3D MIP reconstructions were  performed by the CT technologist. Dose reduction techniques were used.      CONTRAST: 73mL  [id number]    COMPARISON: Chest x-ray 9/26/2024.    FINDINGS:   ANGIOGRAM CHEST: Pulmonary arteries are normal caliber and negative  for pulmonary emboli. Thoracic aorta cannot be evaluated for  dissection due to contrast bolus timing. No thoracic aortic aneurysm.    LUNGS AND PLEURA: Subsegmental atelectasis of the lung bases. No  consolidative pneumonia. Left upper lobe pulmonary nodule measuring 5  mm (series 7, image 42). Left upper lobe subpleural nodule measuring 4  mm (series 7, image 136). Biapical pleural-parenchymal scarring. No  pleural effusion or pneumothorax.    MEDIASTINUM/AXILLAE: Right thyroid nodule measuring 2.8 cm. Normal  heart size without pericardial effusion. No thoracic adenopathy.    CORONARY ARTERY CALCIFICATIONS: Mild.    HEPATOBILIARY: Subcentimeter hypoattenuating lesion in the right  hepatic lobe, too small to characterize but not requiring specific  follow-up. No radiopaque gallstones. No biliary ductal dilatation.    PANCREAS: Mild prominence of the main pancreatic duct measuring up to  3 mm in diameter. No peripancreatic inflammatory changes or pancreatic  mass lesion identified.    SPLEEN: Normal size.    ADRENAL GLANDS: No significant nodules.    KIDNEYS/BLADDER: Symmetric renal enhancement without hydronephrosis.  Marked distention of the urinary bladder.    BOWEL: No obstruction or inflammatory change.    PELVIC ORGANS: Hysterectomy.    ADDITIONAL FINDINGS: No ascites. No adenopathy in the abdomen or  pelvis. Mild burden of atherosclerotic disease without abdominal  aortic aneurysm.    MUSCULOSKELETAL: Age indeterminate minimally displaced fracture of the  coccyx.      Impression    IMPRESSION:  1.  No evidence of acute pulmonary embolism.  2.  Age-indeterminate minimally displaced fracture of the coccyx which  can be correlated with point tenderness. Otherwise, no acute traumatic  findings within the chest, abdomen, or pelvis.  3.  Marked distention of the urinary bladder.  4.   Right thyroid nodule measuring 2.8 cm. Recommend outpatient  thyroid ultrasound.  5.  Pulmonary nodules measuring up to 5 mm in size. See  recommendations below.    REFERENCE:  Guidelines for Management of Incidental Pulmonary Nodules Detected on  CT Images: From the Fleischner Society 2017.   Guidelines apply to incidental nodules in patients who are 35 years or  older.  Guidelines do not apply to lung cancer screening, patients with  immunosuppression, or patients with known primary cancer.    MULTIPLE NODULES  Nodule size <6 mm  Low-risk patients: No follow-up needed.  High-risk patients: Optional follow-up at 12 months.    MICHELLE FERGUSON MD         SYSTEM ID:  [id number]   UA with Microscopic reflex to Culture    Specimen: Urine, Midstream   Result Value Ref Range    Color Urine Light Yellow Colorless, Straw, Light Yellow, Yellow    Appearance Urine Clear Clear    Glucose Urine Negative Negative mg/dL    Bilirubin Urine Negative Negative    Ketones Urine 20 (A) Negative mg/dL    Specific Gravity Urine 1.005 1.003 - 1.035    Blood Urine Negative Negative    pH Urine 6.0 5.0 - 7.0    Protein Albumin Urine Negative Negative mg/dL    Urobilinogen Urine Normal Normal, 2.0 mg/dL    Nitrite Urine Negative Negative    Leukocyte Esterase Urine Large (A) Negative    Bacteria Urine Few (A) None Seen /HPF    Mucus Urine Present (A) None Seen /LPF    RBC Urine 1 <=2 /HPF    WBC Urine 9 (H) <=5 /HPF    Squamous Epithelials Urine 1 <=1 /HPF    Narrative    Urine Culture ordered based on laboratory criteria   Troponin T, High Sensitivity   Result Value Ref Range    Troponin T, High Sensitivity 25 (H) <=14 ng/L   Lactic acid whole blood   Result Value Ref Range    Lactic Acid 2.3 (H) 0.7 - 2.0 mmol/L   TSH with free T4 reflex   Result Value Ref Range    TSH 0.43 0.30 - 4.20 uIU/mL   Magnesium   Result Value Ref Range    Magnesium 2.1 1.7 - 2.3 mg/dL   EKG 12-lead, tracing only   Result Value Ref Range    Systolic Blood  "Pressure  mmHg    Diastolic Blood Pressure  mmHg    Ventricular Rate 52 BPM    Atrial Rate 52 BPM    NJ Interval 174 ms    QRS Duration 112 ms     ms    QTc 409 ms    P Axis 52 degrees    R AXIS -40 degrees    T Axis 15 degrees    Interpretation ECG       Sinus bradycardia with sinus arrhythmia  Left axis deviation  Incomplete right bundle branch block  Abnormal ECG     Troponin T, High Sensitivity   Result Value Ref Range    Troponin T, High Sensitivity 35 (H) <=14 ng/L       No results found for: \"CHOL\"  No results found for: \"HDL\"  No results found for: \"LDL\"  No results found for: \"TRIG\"  No results found for: \"CHOLHDLRATIO\"  TSH   Date Value Ref Range Status   09/26/2024 0.43 0.30 - 4.20 uIU/mL Final         EKG results:    Echocardiology:    Cardiac stress testing:    Cardiac angiography:    Clinically Significant Risk Factors Present on Admission                                "

## 2024-09-26 NOTE — PHARMACY-ADMISSION MEDICATION HISTORY
Pharmacist Admission Medication History    Admission medication history is complete. The information provided in this note is only as accurate as the sources available at the time of the update.    Information Source(s): Patient and CareEverywhere/SureScripts via in-person    Pertinent Information: None    Changes made to PTA medication list:  Added: all  Deleted: None  Changed: None    Allergies reviewed with patient and updates made in EHR: yes    Medication History Completed By: Merle Marsh [facility] 9/26/2024 10:14 AM    PTA Med List   Medication Sig Last Dose    CALCIUM PO Take 1 tablet by mouth daily. 9/25/2024 at -    MAGNESIUM PO Take 1 tablet by mouth daily. 9/25/2024 at -    montelukast (SINGULAIR) 10 MG tablet Take 10 mg by mouth at bedtime. 9/25/2024 at pm    prednisoLONE acetate (PRED FORTE) 1 % ophthalmic suspension Place 1 drop Into the left eye 2 times daily. After completing two times daily, decrease to daily. 9/25/2024 at pm

## 2024-09-26 NOTE — H&P
[facility] [place]    History and Physical - Hospitalist Service       Date of Admission:  9/26/2024    Assessment & Plan      Sonia Humphrey is a healthy 84 year old female admitted on 9/26/2024 after episode of fall versus syncope and weakness with findings of tachyarrhythmia in the ED.    In the ED she was afebrile with heart rate as high as 151 that spontaneously converted to 61 after IV fluids, pressure 121/73 and breathing comfortably on room air without hypoxia.  Lab work was remarkable for creatinine 1.01, elevated anion gap of 17, glucose 106, normal electrolytes, , high-sensitivity troponin of 14 with 2-hour repeat of 25, normal CBC and normal TSH.  EKG showed tachyarrhythmia.  UA showed large amount of leukocyte Estrace with 9 white blood cells.  Urine was sent for culture and blood culture was sent.  In the ED she received 500 mL bolus and was started on a heparin drip with observation admission.     #Syncope with tachyarrythmia  -Patient was in her usual state of health and woke up at 4 AM to use the restroom but reports upon getting out of bed she felt globally weak and fell to the floor with inability to get up  -During this time she did not describe palpitations or sensation like her heart was racing but her breathing was difficult  -Did hit her head and CT head is negative  -On arrival to the ED she was in a tachyarrhythmia concerning for A-fib versus a flutter and was started on a heparin drip  -No hx of arrythmias. Did have some diarrhea after lunch on 9/25 so possible hypovolemia playing a role  -Troponin did also elevate during this time to 25  -After receiving 1/2 L of fluids her heart rate improved into the 60s and she felt better  -Initial ECG is read as undetermined rhythm and second ECG is read as bradycardia  -She does not completely meet orthostatic criteria but did have lower blood pressure with standing and felt mild lightheadedness  -Hepar drip was stopped as  it does not appear to be A-fib  -TSH normal, electrolytes normal  -Abnormal UA but does not describe urinary symptoms  -Cardiology consult  -Monitor on tele  -Repeat troponin  -Echocardiogram           Observation Goals: -diagnostic tests and consults completed and resulted, -vital signs normal or at patient baseline, -returns to baseline functional status, Nurse to notify provider when observation goals have been met and patient is ready for discharge.  Diet: Regular Diet Adult    DVT Prophylaxis: Pneumatic Compression Devices  Jasso Catheter: Not present  Lines: None     Cardiac Monitoring: ACTIVE order. Indication: Tachyarrhythmias, acute (48 hours)  Code Status: Full Code      Clinically Significant Risk Factors Present on Admission                                     Disposition Plan     Medically Ready for Discharge: Anticipated Tomorrow         The patient's care was discussed with the Attending Physician, Dr. Abdullahi, Patient, Patient's Family, and ED Consultant(s).    Jamila Camara PA-C  Hospitalist Service  [facility]  Securely message with [employer] (more info)  Text page via [facility] Paging/Directory     ______________________________________________________________________    Chief Complaint   Fall vs syncope, tachyarrhythmia    History is obtained from the patient    History of Present Illness   Sonia Humphrey is a 84 year old female who reports she was in her usual state of health until early this morning.  She reports having used the bathroom at approximately 4 AM and attempting to stand up.  During that time she had some fullness in her stomach but could not pass any gas.  She then reports that she felt globally weak and fell to the ground but was unable to get up.  She felt dizzy/lightheaded and like she was going to pass out.  She is unsure if she did.  When EMS arrived she was hypoxic and tachycardic with hypotension.  She has not been ill recently and denies chest  pain, cough, fever, chills and urinary symptoms.  She had some diarrhea after lunch yesterday.  She lives with her daughter and does not ambulate with a walker or cane.  She does not drink alcohol or smoke cigarettes.      Past Medical History    No past medical history on file.    Past Surgical History   No past surgical history on file.    Prior to Admission Medications   Prior to Admission Medications   Prescriptions Last Dose Informant Patient Reported? Taking?   CALCIUM PO 9/25/2024 at -  Yes Yes   Sig: Take 1 tablet by mouth daily.   MAGNESIUM PO 9/25/2024 at -  Yes Yes   Sig: Take 1 tablet by mouth daily.   montelukast (SINGULAIR) 10 MG tablet 9/25/2024 at pm  Yes Yes   Sig: Take 10 mg by mouth at bedtime.   prednisoLONE acetate (PRED FORTE) 1 % ophthalmic suspension 9/25/2024 at pm  Yes Yes   Sig: Place 1 drop Into the left eye 2 times daily. After completing two times daily, decrease to daily.      Facility-Administered Medications: None          Physical Exam   Vital Signs: Temp: 98.3  F (36.8  C) Temp src: Oral BP: 97/43 Pulse: 62   Resp: 17 SpO2: 97 % O2 Device: None (Room air)    Weight: 149 lbs 14.6 oz    General Appearance: Alert and orientated x 3  Respiratory: CTAB  Cardiovascular: RRR without murmur  GI: Bowel sounds are present without tenderness  Skin: No rash or open sores  Other: No lower extremity swelling    Medical Decision Making       55 MINUTES SPENT BY ME on the date of service doing chart review, history, exam, documentation & further activities per the note.      Data     I have personally reviewed the following data over the past 24 hrs:    8.1  \   14.9   / 296     139 104 15.7 /  106 (H)   4.7 18 (L) 1.01 (H) \     Trop: 25 (H) BNP: 222     TSH: 0.43 T4: N/A A1C: N/A     Procal: N/A CRP: N/A Lactic Acid: 2.3 (H)         Imaging results reviewed over the past 24 hrs:   Recent Results (from the past 24 hour(s))   XR Chest Port 1 View    Narrative    EXAM: XR CHEST PORT 1  VIEW  LOCATION: [facility]  DATE: 9/26/2024    INDICATION: Shortness of breath  COMPARISON: None.      Impression    IMPRESSION: Hyperinflation. Areas of increased nodular density in the left lung base projecting over the heart may represent retrocardiac infiltrate or pneumonia. Normal heart size and pulmonary vascularity. Aortic calcification.   Head CT w/o contrast    Narrative    EXAM: CT HEAD W/O CONTRAST  9/26/2024 7:43 AM     HISTORY:  Fall, struck head       COMPARISON:  No prior similar studies    TECHNIQUE: Using multidetector thin collimation helical acquisition  technique, axial, coronal and sagittal CT images from the skull base  to the vertex were obtained without intravenous contrast.   (topogram) image(s) also obtained and reviewed. Dose reduction  techniques were performed.    FINDINGS:  No intracranial hemorrhage, mass effect, or midline shift. No acute  loss of gray-white matter differentiation in the cerebral hemispheres.  Ventricles are proportionate to the cerebral sulci. Clear basal  cisterns. Periventricular and subcortical hypodensities, representing  chronic small vessel ischemic disease.    The bony calvaria and the bones of the skull base are normal.  Hyperdense lesion in the left maxillary sinus, may represent an  unerupted tooth or osteoma. Mastoid air cells are clear. Grossly  normal orbits.       Impression    IMPRESSION:   1. No acute intracranial pathology.   2. Chronic small vessel ischemic disease.    MIRELA VILLANUEVA MD         SYSTEM ID:  [id number]   CT Chest (PE) Abdomen Pelvis w Contrast    Narrative    CT CHEST PE ABDOMEN PELVIS W CONTRAST 9/26/2024 7:56 AM    CLINICAL HISTORY: Patient developed sudden shortness of breath. Fall.  Difficulty emptying bladder.    TECHNIQUE: TECHNIQUE: CT angiogram chest and routine CT abdomen pelvis  with IV contrast. Arterial phase through the chest and venous phase  through the abdomen and pelvis. 2D and 3D MIP  reconstructions were  performed by the CT technologist. Dose reduction techniques were used.      CONTRAST: 73mL [id number]    COMPARISON: Chest x-ray 9/26/2024.    FINDINGS:   ANGIOGRAM CHEST: Pulmonary arteries are normal caliber and negative  for pulmonary emboli. Thoracic aorta cannot be evaluated for  dissection due to contrast bolus timing. No thoracic aortic aneurysm.    LUNGS AND PLEURA: Subsegmental atelectasis of the lung bases. No  consolidative pneumonia. Left upper lobe pulmonary nodule measuring 5  mm (series 7, image 42). Left upper lobe subpleural nodule measuring 4  mm (series 7, image 136). Biapical pleural-parenchymal scarring. No  pleural effusion or pneumothorax.    MEDIASTINUM/AXILLAE: Right thyroid nodule measuring 2.8 cm. Normal  heart size without pericardial effusion. No thoracic adenopathy.    CORONARY ARTERY CALCIFICATIONS: Mild.    HEPATOBILIARY: Subcentimeter hypoattenuating lesion in the right  hepatic lobe, too small to characterize but not requiring specific  follow-up. No radiopaque gallstones. No biliary ductal dilatation.    PANCREAS: Mild prominence of the main pancreatic duct measuring up to  3 mm in diameter. No peripancreatic inflammatory changes or pancreatic  mass lesion identified.    SPLEEN: Normal size.    ADRENAL GLANDS: No significant nodules.    KIDNEYS/BLADDER: Symmetric renal enhancement without hydronephrosis.  Marked distention of the urinary bladder.    BOWEL: No obstruction or inflammatory change.    PELVIC ORGANS: Hysterectomy.    ADDITIONAL FINDINGS: No ascites. No adenopathy in the abdomen or  pelvis. Mild burden of atherosclerotic disease without abdominal  aortic aneurysm.    MUSCULOSKELETAL: Age indeterminate minimally displaced fracture of the  coccyx.      Impression    IMPRESSION:  1.  No evidence of acute pulmonary embolism.  2.  Age-indeterminate minimally displaced fracture of the coccyx which  can be correlated with point tenderness. Otherwise, no  acute traumatic  findings within the chest, abdomen, or pelvis.  3.  Marked distention of the urinary bladder.  4.  Right thyroid nodule measuring 2.8 cm. Recommend outpatient  thyroid ultrasound.  5.  Pulmonary nodules measuring up to 5 mm in size. See  recommendations below.    REFERENCE:  Guidelines for Management of Incidental Pulmonary Nodules Detected on  CT Images: From the Fleischner Society 2017.   Guidelines apply to incidental nodules in patients who are 35 years or  older.  Guidelines do not apply to lung cancer screening, patients with  immunosuppression, or patients with known primary cancer.    MULTIPLE NODULES  Nodule size <6 mm  Low-risk patients: No follow-up needed.  High-risk patients: Optional follow-up at 12 months.    MICHELLE FERGUSON MD         SYSTEM ID:  [id number]

## 2024-09-26 NOTE — ED TRIAGE NOTES
Patient arrives via EMS from home. Patient was short of breath and then fell hitting her head. Bruise to left temporal. Denies chest pain. Per EMS HR 150s and bp 112/120s. Close to the ER patient became symptomatic with needing oxygen and low blood pressures.

## 2024-09-26 NOTE — PLAN OF CARE
ROOM # 208-2    Living Situation (if not independent, order SW consult):  Facility name: Has own home in [facility] when in [place] lives with her two daughters  :     Activity level at baseline: independent  Activity level on admit: SBA    Who will be transporting you at discharge:     Patient registered to observation; given Patient Bill of Rights; given the opportunity to ask questions about observation status and their plan of care.  Patient has been oriented to the observation room, bathroom and call light is in place.    Discussed discharge goals and expectations with patient/family.         Goal Outcome Evaluation:

## 2024-09-27 ENCOUNTER — APPOINTMENT (OUTPATIENT)
Dept: CARDIOLOGY | Facility: CLINIC | Age: 84
End: 2024-09-27
Attending: INTERNAL MEDICINE
Payer: COMMERCIAL

## 2024-09-27 VITALS
SYSTOLIC BLOOD PRESSURE: 113 MMHG | DIASTOLIC BLOOD PRESSURE: 44 MMHG | HEART RATE: 83 BPM | WEIGHT: 149.91 LBS | OXYGEN SATURATION: 93 % | TEMPERATURE: 98.3 F | RESPIRATION RATE: 20 BRPM

## 2024-09-27 LAB
ANION GAP SERPL CALCULATED.3IONS-SCNC: 11 MMOL/L (ref 7–15)
ATRIAL RATE - MUSE: 62 BPM
ATRIAL RATE - MUSE: 94 BPM
BACTERIA UR CULT: NORMAL
BUN SERPL-MCNC: 13.4 MG/DL (ref 8–23)
CALCIUM SERPL-MCNC: 8.7 MG/DL (ref 8.8–10.4)
CHLORIDE SERPL-SCNC: 105 MMOL/L (ref 98–107)
CREAT SERPL-MCNC: 0.99 MG/DL (ref 0.51–0.95)
DIASTOLIC BLOOD PRESSURE - MUSE: NORMAL MMHG
DIASTOLIC BLOOD PRESSURE - MUSE: NORMAL MMHG
EGFRCR SERPLBLD CKD-EPI 2021: 56 ML/MIN/1.73M2
ERYTHROCYTE [DISTWIDTH] IN BLOOD BY AUTOMATED COUNT: 12.8 % (ref 10–15)
GLUCOSE SERPL-MCNC: 94 MG/DL (ref 70–99)
HCO3 SERPL-SCNC: 23 MMOL/L (ref 22–29)
HCT VFR BLD AUTO: 40.6 % (ref 35–47)
HGB BLD-MCNC: 13.2 G/DL (ref 11.7–15.7)
INTERPRETATION ECG - MUSE: NORMAL
INTERPRETATION ECG - MUSE: NORMAL
MCH RBC QN AUTO: 32.7 PG (ref 26.5–33)
MCHC RBC AUTO-ENTMCNC: 32.5 G/DL (ref 31.5–36.5)
MCV RBC AUTO: 101 FL (ref 78–100)
P AXIS - MUSE: 64 DEGREES
P AXIS - MUSE: NORMAL DEGREES
PLATELET # BLD AUTO: 266 10E3/UL (ref 150–450)
POTASSIUM SERPL-SCNC: 4.2 MMOL/L (ref 3.4–5.3)
PR INTERVAL - MUSE: 166 MS
PR INTERVAL - MUSE: NORMAL MS
QRS DURATION - MUSE: 102 MS
QRS DURATION - MUSE: 110 MS
QT - MUSE: 282 MS
QT - MUSE: 400 MS
QTC - MUSE: 406 MS
QTC - MUSE: 419 MS
R AXIS - MUSE: -46 DEGREES
R AXIS - MUSE: -60 DEGREES
RBC # BLD AUTO: 4.04 10E6/UL (ref 3.8–5.2)
SODIUM SERPL-SCNC: 139 MMOL/L (ref 135–145)
SYSTOLIC BLOOD PRESSURE - MUSE: NORMAL MMHG
SYSTOLIC BLOOD PRESSURE - MUSE: NORMAL MMHG
T AXIS - MUSE: 18 DEGREES
T AXIS - MUSE: 38 DEGREES
VENTRICULAR RATE- MUSE: 133 BPM
VENTRICULAR RATE- MUSE: 62 BPM
WBC # BLD AUTO: 12.1 10E3/UL (ref 4–11)

## 2024-09-27 PROCEDURE — 93225 XTRNL ECG REC<48 HRS REC: CPT

## 2024-09-27 PROCEDURE — G0378 HOSPITAL OBSERVATION PER HR: HCPCS

## 2024-09-27 PROCEDURE — 99239 HOSP IP/OBS DSCHRG MGMT >30: CPT | Performed by: PHYSICIAN ASSISTANT

## 2024-09-27 PROCEDURE — 258N000003 HC RX IP 258 OP 636: Performed by: PHYSICIAN ASSISTANT

## 2024-09-27 PROCEDURE — 99231 SBSQ HOSP IP/OBS SF/LOW 25: CPT | Mod: 25 | Performed by: INTERNAL MEDICINE

## 2024-09-27 PROCEDURE — 85014 HEMATOCRIT: CPT | Performed by: PHYSICIAN ASSISTANT

## 2024-09-27 PROCEDURE — 80048 BASIC METABOLIC PNL TOTAL CA: CPT | Performed by: PHYSICIAN ASSISTANT

## 2024-09-27 PROCEDURE — 93227 XTRNL ECG REC<48 HR R&I: CPT | Performed by: INTERNAL MEDICINE

## 2024-09-27 PROCEDURE — 250N000013 HC RX MED GY IP 250 OP 250 PS 637: Performed by: INTERNAL MEDICINE

## 2024-09-27 PROCEDURE — 36415 COLL VENOUS BLD VENIPUNCTURE: CPT | Performed by: PHYSICIAN ASSISTANT

## 2024-09-27 RX ORDER — METOPROLOL TARTRATE 25 MG/1
12.5 TABLET, FILM COATED ORAL 2 TIMES DAILY
Qty: 30 TABLET | Refills: 0 | Status: SHIPPED | OUTPATIENT
Start: 2024-09-27

## 2024-09-27 RX ADMIN — SODIUM CHLORIDE, POTASSIUM CHLORIDE, SODIUM LACTATE AND CALCIUM CHLORIDE: 600; 310; 30; 20 INJECTION, SOLUTION INTRAVENOUS at 01:31

## 2024-09-27 RX ADMIN — METOPROLOL TARTRATE 12.5 MG: 25 TABLET, FILM COATED ORAL at 09:28

## 2024-09-27 ASSESSMENT — ACTIVITIES OF DAILY LIVING (ADL)
ADLS_ACUITY_SCORE: 31

## 2024-09-27 NOTE — PLAN OF CARE
PRIMARY DIAGNOSIS: Fall vs Syncope  OUTPATIENT/OBSERVATION GOALS TO BE MET BEFORE DISCHARGE:  ADLs back to baseline: Yes    Activity and level of assistance: Up with standby assistance.    Pain status: Pain free.    Return to near baseline physical activity: Yes     Discharge Planner Nurse   Safe discharge environment identified: Yes  Barriers to discharge: Yes       Entered by: Jannette Hernandez RN 09/27/2024 12:39 AM    Patient is A&Ox4, up SBA, currently denies pain, IVF @ 100, ECHO completed, cardiology following, denies dizziness when out of bed       Please review provider order for any additional goals.   Nurse to notify provider when observation goals have been met and patient is ready for discharge.Goal Outcome Evaluation:      Plan of Care Reviewed With: patient    Overall Patient Progress: improvingOverall Patient Progress: improving    Outcome Evaluation: IVF infusing, denies dizziness

## 2024-09-27 NOTE — PLAN OF CARE
PRIMARY DIAGNOSIS: Fall vs Syncope  OUTPATIENT/OBSERVATION GOALS TO BE MET BEFORE DISCHARGE:  ADLs back to baseline: Yes    Activity and level of assistance: Up with standby assistance.    Pain status: Pain free.    Return to near baseline physical activity: Yes     Discharge Planner Nurse   Safe discharge environment identified: Yes  Barriers to discharge: Yes       Entered by: Jannette Hernandez RN 09/27/2024 4:44 AM    Patient is A&Ox4, up SBA, currently denies pain, IVF @ 100, cards following, denies dizziness when out of bed       Please review provider order for any additional goals.   Nurse to notify provider when observation goals have been met and patient is ready for discharge.Goal Outcome Evaluation:      Plan of Care Reviewed With: patient    Overall Patient Progress: improvingOverall Patient Progress: improving    Outcome Evaluation: dizziness improved, hopeful for discharge today

## 2024-09-27 NOTE — DISCHARGE SUMMARY
[facility] Discharge Summary          Sonia Humphrey MRN# [id number]   Age: 84 year old YOB: 1940     Date of Admission:  9/26/2024  Date of Discharge::  9/27/2024 11:28 AM  Admitting Physician:  Andrews Abdullahi MD  Discharge Physician:  Dorie Lema PA-C  Primary Physician: Sophie Juarez     Primary Discharge Diagnoses:   Near Syncope  AT versus AFL     Hospital Course:   For detail history, please refer to H & P from 9/26/2024. In brief, this is a 84 year old female  admitted on 9/26/2024 after episode of fall versus syncope and weakness with findings of tachyarrhythmia in the ED.     In the ED she was afebrile with heart rate as high as 151 that spontaneously converted to 61 after IV fluids, pressure 121/73 and breathing comfortably on room air without hypoxia.  Lab work was remarkable for creatinine 1.01, elevated anion gap of 17, glucose 106, normal electrolytes, , high-sensitivity troponin of 14 with 2-hour repeat of 25, normal CBC and normal TSH.  EKG showed tachyarrhythmia.  UA showed large amount of leukocyte Estrace with 9 white blood cells.  Urine culture was negative.     Near Syncope with tachyarrhythmia  Left scalp ecchymosis  -Patient was in her usual state of health and woke up at 4 AM to use the restroom but reports upon getting out of bed she felt globally weak and fell to the floor with inability to get up  -During this time she did not describe palpitations or sensation like her heart was racing but her breathing was difficult  -Did hit her head and CT head was negative  -On arrival to the ED she was in a tachyarrhythmia concerning for A-fib versus a flutter and was started on a heparin drip  -No hx of arrythmias. Did have some diarrhea after lunch on 9/25 so possible hypovolemia playing a role  -Troponin did also elevate during this time to 25/35/37  -After receiving 1/2 L of fluids her heart rate improved into the 60s and she felt better  -Initial  ECG is read as undetermined rhythm and second ECG is read as bradycardia  -Heparin drip was stopped as it does not appear to be A-fib  -TSH normal, electrolytes normal  -Abnormal UA but did not describe urinary symptoms and urine culture was negative.  -Cardiology consulted and felt Presyncope possibly associated with reentry tachycardia unknown if atrial tachycardia versus atypical flutter   - echocardiogram with no significant cardiomyopathy  - started on Metoprolol 12.5mg bid with hold parameters  - patient is traveling home to [employer] next week.  - 48 hour Holter monitor was placed prior to patient discharge  - Establish care with a Cardiologist in [employer]     Procedures/Imaging:     Results for orders placed or performed during the hospital encounter of 09/26/24   XR Chest Port 1 View    Narrative    EXAM: XR CHEST PORT 1 VIEW  LOCATION: [facility]  DATE: 9/26/2024    INDICATION: Shortness of breath  COMPARISON: None.      Impression    IMPRESSION: Hyperinflation. Areas of increased nodular density in the left lung base projecting over the heart may represent retrocardiac infiltrate or pneumonia. Normal heart size and pulmonary vascularity. Aortic calcification.   Head CT w/o contrast    Narrative    EXAM: CT HEAD W/O CONTRAST  9/26/2024 7:43 AM     HISTORY:  Fall, struck head       COMPARISON:  No prior similar studies    TECHNIQUE: Using multidetector thin collimation helical acquisition  technique, axial, coronal and sagittal CT images from the skull base  to the vertex were obtained without intravenous contrast.   (topogram) image(s) also obtained and reviewed. Dose reduction  techniques were performed.    FINDINGS:  No intracranial hemorrhage, mass effect, or midline shift. No acute  loss of gray-white matter differentiation in the cerebral hemispheres.  Ventricles are proportionate to the cerebral sulci. Clear basal  cisterns. Periventricular and subcortical hypodensities,  representing  chronic small vessel ischemic disease.    The bony calvaria and the bones of the skull base are normal.  Hyperdense lesion in the left maxillary sinus, may represent an  unerupted tooth or osteoma. Mastoid air cells are clear. Grossly  normal orbits.       Impression    IMPRESSION:   1. No acute intracranial pathology.   2. Chronic small vessel ischemic disease.    MIRELA VILLANUEVA MD         SYSTEM ID:  [id number]   CT Chest (PE) Abdomen Pelvis w Contrast    Narrative    CT CHEST PE ABDOMEN PELVIS W CONTRAST 9/26/2024 7:56 AM    CLINICAL HISTORY: Patient developed sudden shortness of breath. Fall.  Difficulty emptying bladder.    TECHNIQUE: TECHNIQUE: CT angiogram chest and routine CT abdomen pelvis  with IV contrast. Arterial phase through the chest and venous phase  through the abdomen and pelvis. 2D and 3D MIP reconstructions were  performed by the CT technologist. Dose reduction techniques were used.      CONTRAST: 73mL [id number]    COMPARISON: Chest x-ray 9/26/2024.    FINDINGS:   ANGIOGRAM CHEST: Pulmonary arteries are normal caliber and negative  for pulmonary emboli. Thoracic aorta cannot be evaluated for  dissection due to contrast bolus timing. No thoracic aortic aneurysm.    LUNGS AND PLEURA: Subsegmental atelectasis of the lung bases. No  consolidative pneumonia. Left upper lobe pulmonary nodule measuring 5  mm (series 7, image 42). Left upper lobe subpleural nodule measuring 4  mm (series 7, image 136). Biapical pleural-parenchymal scarring. No  pleural effusion or pneumothorax.    MEDIASTINUM/AXILLAE: Right thyroid nodule measuring 2.8 cm. Normal  heart size without pericardial effusion. No thoracic adenopathy.    CORONARY ARTERY CALCIFICATIONS: Mild.    HEPATOBILIARY: Subcentimeter hypoattenuating lesion in the right  hepatic lobe, too small to characterize but not requiring specific  follow-up. No radiopaque gallstones. No biliary ductal dilatation.    PANCREAS: Mild prominence of the  main pancreatic duct measuring up to  3 mm in diameter. No peripancreatic inflammatory changes or pancreatic  mass lesion identified.    SPLEEN: Normal size.    ADRENAL GLANDS: No significant nodules.    KIDNEYS/BLADDER: Symmetric renal enhancement without hydronephrosis.  Marked distention of the urinary bladder.    BOWEL: No obstruction or inflammatory change.    PELVIC ORGANS: Hysterectomy.    ADDITIONAL FINDINGS: No ascites. No adenopathy in the abdomen or  pelvis. Mild burden of atherosclerotic disease without abdominal  aortic aneurysm.    MUSCULOSKELETAL: Age indeterminate minimally displaced fracture of the  coccyx.      Impression    IMPRESSION:  1.  No evidence of acute pulmonary embolism.  2.  Age-indeterminate minimally displaced fracture of the coccyx which  can be correlated with point tenderness. Otherwise, no acute traumatic  findings within the chest, abdomen, or pelvis.  3.  Marked distention of the urinary bladder.  4.  Right thyroid nodule measuring 2.8 cm. Recommend outpatient  thyroid ultrasound.  5.  Pulmonary nodules measuring up to 5 mm in size. See  recommendations below.    REFERENCE:  Guidelines for Management of Incidental Pulmonary Nodules Detected on  CT Images: From the Fleischner Society 2017.   Guidelines apply to incidental nodules in patients who are 35 years or  older.  Guidelines do not apply to lung cancer screening, patients with  immunosuppression, or patients with known primary cancer.    MULTIPLE NODULES  Nodule size <6 mm  Low-risk patients: No follow-up needed.  High-risk patients: Optional follow-up at 12 months.    MICHELLE FERGUSON MD         SYSTEM ID:  [id number]   Echocardiogram Complete     Value    LVEF  65-70%    Narrative    [id number]  [id number]  [id number]  [id number]^CALEB^MIKAYLA^[facility]  Echocardiography Laboratory  [address] [place] [address]     Name: MAKSIM MEDEL  MRN: [id number]  : 1940  Study Date:  09/26/2024 12:46 PM  Age: 84 yrs  Gender: Female  Patient Location: [facility]  Reason For Study: Tachycardia  Ordering Physician: MIKAYLA ELLIS  Performed By: Nilda Bear     BSA: 1.7 m2  Height: 61 in  Weight: 149 lb  BP: 97/43 mmHg  ______________________________________________________________________________  Procedure  Complete Portable Echo Adult.  ______________________________________________________________________________  Interpretation Summary     The visual ejection fraction is 65-70%.  Left ventricular systolic function is normal.  ______________________________________________________________________________  Left Ventricle  The left ventricle is normal in size. There is normal left ventricular wall  thickness. Diastolic Doppler findings (E/E' ratio and/or other parameters)  suggest left ventricular filling pressures are normal. The visual ejection  fraction is 65-70%. Left ventricular systolic function is normal.     Right Ventricle  The right ventricle is normal in size and function.     Atria  Normal left atrial size. Right atrial size is normal. There is no color  Doppler evidence of an atrial shunt.     Mitral Valve  There is mild (1+) mitral regurgitation.     Tricuspid Valve  There is trace tricuspid regurgitation. The right ventricular systolic  pressure is approximated at 22.4 mmHg plus the right atrial pressure.     Aortic Valve  The aortic valve is trileaflet. No aortic regurgitation is present. No  hemodynamically significant valvular aortic stenosis.     Pulmonic Valve  There is no pulmonic valvular regurgitation. Normal pulmonic valve velocity.     Vessels  The aortic root is normal size. Normal size ascending aorta. IVC diameter <2.1  cm collapsing >50% with sniff suggests a normal RA pressure of 3 mmHg.     Pericardium  There is no pericardial effusion.     Rhythm  Sinus rhythm was noted.  ______________________________________________________________________________  MMode/2D  Measurements & Calculations  IVSd: 0.73 cm  LVIDd: 5.0 cm  LVIDs: 2.4 cm  LVPWd: 0.55 cm  FS: 51.5 %  LV mass(C)d: 102.0 grams  LV mass(C)dI: 61.2 grams/m2  Ao root diam: 2.9 cm  asc Aorta Diam: 3.0 cm  LVOT diam: 1.9 cm  LVOT area: 2.8 cm2  Ao root diam index Ht(cm/m): 1.9  Ao root diam index BSA (cm/m2): 1.8  Asc Ao diam index BSA (cm/m2): 1.8  Asc Ao diam index Ht(cm/m): 2.0  LA Volume (BP): 53.5 ml     LA Volume Index (BP): 32.0 ml/m2  RWT: 0.22  TAPSE: 2.6 cm     Doppler Measurements & Calculations  MV E max chiara: 67.3 cm/sec  MV A max chiara: 35.3 cm/sec  MV E/A: 1.9  MV dec slope: 293.9 cm/sec2  MV dec time: 0.23 sec  PA acc time: 0.10 sec  TR max chiara: 236.4 cm/sec  TR max P.4 mmHg  E/E' av.5  Lateral E/e': 4.9  Medial E/e': 6.0     ______________________________________________________________________________  Report approved by: Broderick Almendarez 2024 02:02 PM           Allergies:     Allergies   Allergen Reactions    Amoxicillin         Subjective:   Patient reports she feels at her baseline currently.  Planning to fly home to [place] next week.  Staying with family currently.    Physical Exam:   Blood pressure 113/44, pulse 83, temperature 98.3  F (36.8  C), temperature source Oral, resp. rate 20, weight 68 kg (149 lb 14.6 oz), SpO2 93%.  General: Alert, interactive, NAD  HEENT: mild ecchymosis of the left temple area  Resp: clear to auscultation bilaterally, no crackles or wheezes  Cardiac: regular rate and rhythm, no murmur  Abdomen: Soft, nontender, nondistended. +BS.  No rebound or guarding.  Extremities: No LE edema  Skin: Warm and dry  Neuro: Alert & oriented x 3, moves all extremities equally    Discharge Medicatios:        Discharge Medication List as of 2024 10:46 AM        START taking these medications    Details   metoprolol tartrate (LOPRESSOR) 25 MG tablet Take 0.5 tablets (12.5 mg) by mouth 2 times daily. Hold if your heart rate is less than 60 or systolic blood  pressure is less than 120, Disp-30 tablet, R-0, E-Prescribe           CONTINUE these medications which have NOT CHANGED    Details   CALCIUM PO Take 1 tablet by mouth daily., Historical      MAGNESIUM PO Take 1 tablet by mouth daily., Historical      montelukast (SINGULAIR) 10 MG tablet Take 10 mg by mouth at bedtime., Historical      prednisoLONE acetate (PRED FORTE) 1 % ophthalmic suspension Place 1 drop Into the left eye 2 times daily. After completing two times daily, decrease to daily., Historical             Instructions Given to Patient as Discharge:     Discharge Procedure Orders   Reason for your hospital stay   Order Comments: You were hospitalized due to Presyncope possibly associated with reentry tachycardia unknown if atrial tachycardia versus atypical flutter.  Your HR improved with IVF.  Cardiology was consulted and you had an echocardiogram with no acute abnormalities.  Cardiology recommends to start Metoprolol and discharge on a heart monitor     Follow-up and recommended labs and tests    Order Comments: Complete your heart monitor here as instructed.  Follow up with PCP in one week and Cardiology.     Activity   Order Comments: Your activity upon discharge: activity as tolerated     Order Specific Question Answer Comments   Is discharge order? Yes      Diet   Order Comments: Follow this diet upon discharge: Current Diet:Orders Placed This Encounter      Regular Diet Adult     Order Specific Question Answer Comments   Is discharge order? Yes        Pending Tests at Discharge:   Holter monitor    Discharge Disposition:     Discharged to home     Dorie Lema MS, PA-C  Hospitalist Service  Pager [phone]    >30 minutes was spent in discharge planning, care coordination, physical examination and medication reconciliation on the date of discharge, 9/27/2024

## 2024-09-27 NOTE — PLAN OF CARE
PRIMARY DIAGNOSIS: Syncope vs Fall  OUTPATIENT/OBSERVATION GOALS TO BE MET BEFORE DISCHARGE:  ADLs back to baseline: Yes    Activity and level of assistance: Up with standby assistance.    Pain status: Pain free.    Return to near baseline physical activity: No     Discharge Planner Nurse   Safe discharge environment identified: Yes  Barriers to discharge: Yes       Entered by: Jannette Hernandez RN 09/26/2024 8:34 PM    Patient currently denies pain, denies dizziness when out of bed, IVF @ 100, ECHO completed, cardiology following     Please review provider order for any additional goals.   Nurse to notify provider when observation goals have been met and patient is ready for discharge.Goal Outcome Evaluation:      Plan of Care Reviewed With: patient    Overall Patient Progress: improvingOverall Patient Progress: improving    Outcome Evaluation: denies dizziness, IVF @ 100

## 2024-09-27 NOTE — PLAN OF CARE
Patient's After Visit Summary was reviewed with patient .   Patient verbalized understanding of After Visit Summary, recommended follow up and was given an opportunity to ask questions.   Discharge medications sent home with patient/family: YES,   Discharged with daughter

## 2024-09-27 NOTE — PROGRESS NOTES
[facility]  Cardiology Progress Note  Date of Service: 09/27/2024  Primary Cardiologist: will establish care in [place]    Assessment & Plan    Sonia Humphrey is a 84 year old female admitted on 9/26/2024.    Assessment:  1.  AT versus AFL; ideally would discharge on 14 day cardiac monitor for further evaluation. If AFL/AFIB documented, would start chronic AC in the setting of elevated CHADsVASC score of 3 (agex2, female). However, patient returning to [place] (where she lives) Wednesday of next week. Therefore, we agreed to discharge on 48 hour Holter monitor. She understands the risks associated with not utilizing AC at this time.   2.  Near syncope secondary to #1    Plan:   Medications   -continue metoprolol tartrate 12.5 mg BID  48 hour Holter monitor to be placed prior to patient discharge  Establish care with a Cardiologist in [place]  Okay to discharge today from a cardiology perspective. Cardiology will sign off.    Denise Toro PA-C  [facility] - [facility]  Pager: [phone]    Interval History   No complaints, resting in bed. No chest pain, SOB, palpitations, or near syncope    Physical Exam   Temp: 99.1  F (37.3  C) Temp src: Oral BP: 138/45 Pulse: 83   Resp: 18 SpO2: 93 % O2 Device: None (Room air)    Vitals:    09/26/24 0835   Weight: 68 kg (149 lb 14.6 oz)       GEN: well nourished, in no acute distress.  HEENT:  Pupils equal, round. Sclerae nonicteric.   NECK: Supple, no masses appreciated. No JVD  C/V:  Regular rate and rhythm, no murmur, rub or gallop.    RESP: Respirations are unlabored. Clear to auscultation bilaterally without wheezing, rales, or rhonchi.  GI: Abdomen soft, nontender.  EXTREM: no LE edema.  NEURO: Alert and oriented, cooperative.  SKIN: Warm and dry.     Medications   Current Facility-Administered Medications   Medication Dose Route Frequency Provider Last Rate Last Admin     Current Facility-Administered Medications   Medication Dose  Route Frequency Provider Last Rate Last Admin    metoprolol tartrate (LOPRESSOR) half-tab 12.5 mg  12.5 mg Oral BID Manuel Pham MD        montelukast (SINGULAIR) tablet 10 mg  10 mg Oral At Bedtime Jamila Camara PA-C   10 mg at 09/26/24 2107       Data   Last 24 hours labs reviewed     Tele: SR 80s    Echo 9/26/2024  The visual ejection fraction is 65-70%.  Left ventricular systolic function is normal.

## 2024-09-27 NOTE — PLAN OF CARE
PRIMARY DIAGNOSIS: FALL  OUTPATIENT/OBSERVATION GOALS TO BE MET BEFORE DISCHARGE:  ADLs back to baseline: Yes    Activity and level of assistance: Up with standby assistance.    Pain status: Pain free.    Return to near baseline physical activity: Yes     Discharge Planner Nurse   Safe discharge environment identified: Yes  Barriers to discharge: No       Entered by: Princess Chaney RN 09/27/2024     Vitals are Temp: 98.3  F (36.8  C) Temp src: Oral BP: 113/44 Pulse: 83   Resp: 20 SpO2: 93 %.  Patient is Alert and Oriented x4. They are SBA with no assistive devices .  Pt is a Regular diet.  They are denying pain.  Patient is [place] locked. Plan to discharge back home today.     Please review provider order for any additional goals.   Nurse to notify provider when observation goals have been met and patient is ready for discharge.

## 2024-09-28 ENCOUNTER — PATIENT OUTREACH (OUTPATIENT)
Dept: CARE COORDINATION | Facility: CLINIC | Age: 84
End: 2024-09-28
Payer: COMMERCIAL

## 2024-09-28 NOTE — PROGRESS NOTES
Clinic Care Coordination Contact  Transitions of Care Outreach    Chief Complaint   Patient presents with    Clinic Care Coordination - Post [facility]       Most Recent Admission Date: 9/26/2024   Most Recent Admission Diagnosis:      Most Recent Discharge Date: 9/27/2024   Most Recent Discharge Diagnosis: Tachyarrhythmia - R00.0  Ground-level fall - W18.30XA  Thyroid nodule - E04.1  Pulmonary nodules - R91.8  Closed fracture of coccyx, initial encounter (H) - S32.2XXA     Transitions of Care Assessment    Discharge Assessment  How are you doing now that you are home?: feeling better  How are your symptoms? (Red Flag symptoms escalate to triage hotline per guidelines): Improved  Do you know how to contact your clinic care team if you have future questions or changes to your health status? : Yes  Does the patient have their discharge instructions? : Yes  Does the patient have questions regarding their discharge instructions? : No  Were you started on any new medications or were there changes to any of your previous medications? : Yes  Does the patient have all of their medications?: Yes  Do you have questions regarding any of your medications? : No  Do you have all of your needed medical supplies or equipment (DME)?  (i.e. oxygen tank, CPAP, cane, etc.): Yes    Post-op ([facility] Only)  If the patient had a surgery or procedure, do they have any questions for a nurse?: No         Follow up Plan       No future appointments.  Outpatient Plan as outlined on AVS reviewed with patient.    Follow-up and recommended labs and tests  Complete your heart monitor here as instructed. Follow up with PCP in one week and Cardiology.    For any urgent concerns, please contact our 24 hour nurse triage line: [phone]     ZAFAR Ryan  [phone]  Connected Care Resource [facility]

## 2024-09-29 LAB
ATRIAL RATE - MUSE: 52 BPM
DIASTOLIC BLOOD PRESSURE - MUSE: NORMAL MMHG
INTERPRETATION ECG - MUSE: NORMAL
P AXIS - MUSE: 52 DEGREES
PR INTERVAL - MUSE: 174 MS
QRS DURATION - MUSE: 112 MS
QT - MUSE: 440 MS
QTC - MUSE: 409 MS
R AXIS - MUSE: -40 DEGREES
SYSTOLIC BLOOD PRESSURE - MUSE: NORMAL MMHG
T AXIS - MUSE: 15 DEGREES
VENTRICULAR RATE- MUSE: 52 BPM

## 2024-10-01 LAB — BACTERIA BLD CULT: NO GROWTH
